# Patient Record
Sex: FEMALE | Race: BLACK OR AFRICAN AMERICAN | NOT HISPANIC OR LATINO | Employment: FULL TIME | URBAN - METROPOLITAN AREA
[De-identification: names, ages, dates, MRNs, and addresses within clinical notes are randomized per-mention and may not be internally consistent; named-entity substitution may affect disease eponyms.]

---

## 2021-12-29 ENCOUNTER — HOSPITAL ENCOUNTER (EMERGENCY)
Facility: OTHER | Age: 36
Discharge: HOME | End: 2021-12-29
Attending: EMERGENCY MEDICINE
Payer: MEDICAID

## 2021-12-29 VITALS
RESPIRATION RATE: 18 BRPM | OXYGEN SATURATION: 99 % | DIASTOLIC BLOOD PRESSURE: 66 MMHG | TEMPERATURE: 99 F | BODY MASS INDEX: 44.41 KG/M2 | HEIGHT: 68 IN | WEIGHT: 293 LBS | SYSTOLIC BLOOD PRESSURE: 124 MMHG | HEART RATE: 100 BPM

## 2021-12-29 DIAGNOSIS — H00.014 HORDEOLUM EXTERNUM OF LEFT UPPER EYELID: ICD-10-CM

## 2021-12-29 DIAGNOSIS — H10.523 ANGULAR BLEPHAROCONJUNCTIVITIS OF BOTH EYES: Primary | ICD-10-CM

## 2021-12-29 PROCEDURE — 99284 EMERGENCY DEPT VISIT MOD MDM: CPT

## 2021-12-29 RX ORDER — POLYMYXIN B SULFATE AND TRIMETHOPRIM 1; 10000 MG/ML; [USP'U]/ML
1 SOLUTION OPHTHALMIC EVERY 6 HOURS
Qty: 10 ML | Refills: 0 | Status: SHIPPED | OUTPATIENT
Start: 2021-12-29 | End: 2022-01-05

## 2021-12-29 RX ORDER — CETIRIZINE HYDROCHLORIDE 10 MG/1
10 TABLET ORAL DAILY
Qty: 10 TABLET | Refills: 0 | Status: SHIPPED | OUTPATIENT
Start: 2021-12-29 | End: 2022-01-08

## 2021-12-29 NOTE — ED PROVIDER NOTES
"     Source of History:  Patient    Chief complaint:  Eye Problem and Dizziness (Pt reports pressure behind eyes and "bloodshot" eyes since last night. Reports some blurred vision this morning, denies at this time. Also reports intermittent dizziness. Denies history of  medical problems. A&OX4. )      HPI:  Alejo Kulkarni is a 36 y.o. female presenting to the emergency department with bilateral eye pain, redness, swelling and crusting which began last night.  Patient states she had floaters to her eye this morning when she woke up this has resolved.  No double vision or loss of vision.  Denies headache.  She reports mild photophobia.  She denies eye trauma.  She wears glasses, no contact lens use.  She denies headache.  Triage note mentions that patient reports intermittent dizziness that is chronic.  She is not dizzy today.  Patient does not mention dizziness to me.  This is the extent to the patients complaints today here in the emergency department.    ROS: As per HPI and below:  General: No fever.  No chills.  No fatigue.  Eyes: + bilateral eye redness, pain, discharge  ENT: No sore throat. No congestion. + sneezing  Head: No headache.    Genito-Urinary: No abnormal urination.  Neurologic: No focal weakness.  No numbness.  MSK: no back pain.  Integument: No rashes or lesions.  Allergy/immunology:  Not immunocompromised.    Review of patient's allergies indicates:  No Known Allergies    PMH:  As per HPI and below:  Past Medical History:   Diagnosis Date    Dysmenorrhea     Morbid obesity      No past surgical history on file.    Social History     Tobacco Use    Smoking status: Never Smoker    Smokeless tobacco: Never Used   Substance Use Topics    Alcohol use: No    Drug use: No       Physical Exam:    /66   Pulse 100   Temp 98.6 °F (37 °C) (Oral)   Resp 18   Ht 5' 8" (1.727 m)   Wt (!) 181.4 kg (400 lb)   SpO2 99%   BMI 60.82 kg/m²   Nursing note and vital signs reviewed.  Appearance: No " acute distress.  Morbidly obese.  Nontoxic appearing.  Eyes:  Bilateral angular conjunctival injection with white/clear crusting discharge at the corners.  External hordeolum noted to the left eye.  PERRLA.  Normal EOM.  ENT: Normal phonation.  Musculoskeletal: Good range of motion all joints.  No deformities.  Neck supple.  No meningismus. Neurovascularly intact.  Skin: No rashes seen.  Good turgor.  No abrasions.  No ecchymoses.  Mental Status:  Alert and oriented x 3.  Appropriate, conversant.  Labs that have been ordered have been independently reviewed and interpreted by myself.    I decided to obtain the patient's medical records.    MDM:    36 y.o. female presenting to the emergency department with bilateral eye pain, redness and crusting.  Patient is afebrile, nontoxic appearing hemodynamically stable.  She had no other symptoms.  The patient has no signs of retained foreign body, rust ring, iritis, ruptured globe, or significant visual acuity deficit.    Patient will be treated with antibiotic drops, advised supportive care, follow-up with Ophthalmology           Diagnostic Impression:    1. Angular blepharoconjunctivitis of both eyes    2. Hordeolum externum of left upper eyelid         ED Disposition Condition    Discharge Stable           Kraig Watson PA-C  12/29/21 1140

## 2021-12-29 NOTE — ED TRIAGE NOTES
"Chief Complaint   Patient presents with    Eye Problem    Dizziness     Pt reports pressure behind eyes and "bloodshot" eyes since last night. Reports some blurred vision this morning, denies at this time. Also reports intermittent dizziness. Denies history of  medical problems. A&OX4.      Pt states that since last night she has had "blood shot" eyes. Both upper eyelids are swollen and redness is noted to bilateral eyes. Pt denies visual changes. Pt denies headache. Pt states that she has had intermittent dizziness "for a while". Not currently dizzy. Pt denies further complaints at this time   "